# Patient Record
Sex: FEMALE | Race: WHITE | Employment: UNEMPLOYED | URBAN - METROPOLITAN AREA
[De-identification: names, ages, dates, MRNs, and addresses within clinical notes are randomized per-mention and may not be internally consistent; named-entity substitution may affect disease eponyms.]

---
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
## 2020-07-30 ENCOUNTER — TELEPHONE (OUTPATIENT)
Dept: FAMILY MEDICINE CLINIC | Age: 20
End: 2020-07-30

## 2020-07-30 NOTE — TELEPHONE ENCOUNTER
Spoke with patient and she is requesting refill of BCP that we are not the prescriber for. Advised patient needs appt--last seen > 1 year ago. Patient will look at her work schedule and call back to schedule.

## 2020-07-31 ENCOUNTER — OFFICE VISIT (OUTPATIENT)
Dept: FAMILY MEDICINE CLINIC | Age: 20
End: 2020-07-31
Payer: COMMERCIAL

## 2020-07-31 VITALS
SYSTOLIC BLOOD PRESSURE: 124 MMHG | WEIGHT: 183 LBS | DIASTOLIC BLOOD PRESSURE: 68 MMHG | HEIGHT: 63 IN | RESPIRATION RATE: 20 BRPM | TEMPERATURE: 96.3 F | HEART RATE: 96 BPM | BODY MASS INDEX: 32.43 KG/M2

## 2020-07-31 PROCEDURE — 99395 PREV VISIT EST AGE 18-39: CPT | Performed by: FAMILY MEDICINE

## 2020-07-31 RX ORDER — NORGESTIMATE AND ETHINYL ESTRADIOL 7DAYSX3 28
KIT ORAL
COMMUNITY
Start: 2020-06-22 | End: 2020-07-31 | Stop reason: SDUPTHER

## 2020-07-31 RX ORDER — NORGESTIMATE AND ETHINYL ESTRADIOL 7DAYSX3 28
KIT ORAL
Qty: 30 TABLET | Refills: 11 | Status: SHIPPED | OUTPATIENT
Start: 2020-07-31 | End: 2021-06-01 | Stop reason: SDUPTHER

## 2020-07-31 SDOH — ECONOMIC STABILITY: INCOME INSECURITY: HOW HARD IS IT FOR YOU TO PAY FOR THE VERY BASICS LIKE FOOD, HOUSING, MEDICAL CARE, AND HEATING?: NOT HARD AT ALL

## 2020-07-31 SDOH — ECONOMIC STABILITY: FOOD INSECURITY: WITHIN THE PAST 12 MONTHS, YOU WORRIED THAT YOUR FOOD WOULD RUN OUT BEFORE YOU GOT MONEY TO BUY MORE.: NEVER TRUE

## 2020-07-31 SDOH — ECONOMIC STABILITY: TRANSPORTATION INSECURITY
IN THE PAST 12 MONTHS, HAS LACK OF TRANSPORTATION KEPT YOU FROM MEETINGS, WORK, OR FROM GETTING THINGS NEEDED FOR DAILY LIVING?: NO

## 2020-07-31 SDOH — ECONOMIC STABILITY: FOOD INSECURITY: WITHIN THE PAST 12 MONTHS, THE FOOD YOU BOUGHT JUST DIDN'T LAST AND YOU DIDN'T HAVE MONEY TO GET MORE.: NEVER TRUE

## 2020-07-31 SDOH — ECONOMIC STABILITY: TRANSPORTATION INSECURITY
IN THE PAST 12 MONTHS, HAS THE LACK OF TRANSPORTATION KEPT YOU FROM MEDICAL APPOINTMENTS OR FROM GETTING MEDICATIONS?: NO

## 2020-07-31 ASSESSMENT — PATIENT HEALTH QUESTIONNAIRE - PHQ9
SUM OF ALL RESPONSES TO PHQ QUESTIONS 1-9: 0
SUM OF ALL RESPONSES TO PHQ9 QUESTIONS 1 & 2: 0
1. LITTLE INTEREST OR PLEASURE IN DOING THINGS: 0
SUM OF ALL RESPONSES TO PHQ QUESTIONS 1-9: 0
2. FEELING DOWN, DEPRESSED OR HOPELESS: 0

## 2020-07-31 ASSESSMENT — ENCOUNTER SYMPTOMS
RESPIRATORY NEGATIVE: 1
GASTROINTESTINAL NEGATIVE: 1

## 2020-07-31 NOTE — PROGRESS NOTES
Visit Information    Have you changed or started any medications since your last visit including any over-the-counter medicines, vitamins, or herbal medicines? no   Are you having any side effects from any of your medications? -  no  Have you stopped taking any of your medications? Is so, why? -  no    Have you seen any other physician or provider since your last visit? No  Have you had any other diagnostic tests since your last visit? No  Have you been seen in the emergency room and/or had an admission to a hospital since we last saw you? No  Have you had your routine dental cleaning in the past 6 months? no    Have you activated your [employer] account? If not, what are your barriers?  No: pt choice     Patient Care Team:  Anna Reynaga DO as PCP - General (Family Medicine)  Anna Reynaga DO as PCP - [facility] Provider    Medical History Review  Past Medical, Family, and Social History reviewed and does contribute to the patient presenting condition    Health Maintenance   Topic Date Due    Varicella vaccine (1 of 2 - 2-dose childhood series) 01/23/2001    HIV screen  01/23/2015    Chlamydia screen  01/23/2016    Flu vaccine (1) 09/01/2020    DTaP/Tdap/Td vaccine (2 - Td) 05/30/2022    HPV vaccine  Completed    Meningococcal (ACWY) vaccine  Completed    Hepatitis A vaccine  Aged Out    Hepatitis B vaccine  Aged Out    Hib vaccine  Aged Out    Pneumococcal 0-64 years Vaccine  Aged Out

## 2021-06-01 DIAGNOSIS — Z30.41 ORAL CONTRACEPTIVE USE: ICD-10-CM

## 2021-06-01 RX ORDER — NORGESTIMATE AND ETHINYL ESTRADIOL 7DAYSX3 28
KIT ORAL
Qty: 30 TABLET | Refills: 11 | Status: SHIPPED | OUTPATIENT
Start: 2021-06-01 | End: 2022-06-09 | Stop reason: SDUPTHER

## 2022-05-27 DIAGNOSIS — Z30.41 ORAL CONTRACEPTIVE USE: ICD-10-CM

## 2022-05-27 RX ORDER — NORGESTIMATE AND ETHINYL ESTRADIOL 7DAYSX3 28
KIT ORAL
Qty: 28 TABLET | OUTPATIENT
Start: 2022-05-27

## 2022-06-02 DIAGNOSIS — Z30.41 ORAL CONTRACEPTIVE USE: ICD-10-CM

## 2022-06-02 RX ORDER — NORGESTIMATE AND ETHINYL ESTRADIOL 7DAYSX3 28
KIT ORAL
Qty: 30 TABLET | Refills: 11 | OUTPATIENT
Start: 2022-06-02

## 2022-06-02 NOTE — TELEPHONE ENCOUNTER
Stephanie Zapata called requesting a refill of the below medication which has been pended for you:     Requested Prescriptions     Pending Prescriptions Disp Refills    Norgestim-Eth Estrad Triphasic 0.18/0.215/0.25 MG-35 MCG TABS 30 tablet 11     Sig: take 1 tablet by mouth once daily       Last Appointment Date: 7/31/2020  Next Appointment Date: Visit date not found    No Known Allergies     If no call back patient will check with ETHAN Marvin at 2 pm today.

## 2022-06-06 NOTE — TELEPHONE ENCOUNTER
We have made numerous attempts to contact pt to schedule appt. See 5/27/22 and 6/2/22 Nany regarding.

## 2022-06-06 NOTE — TELEPHONE ENCOUNTER
----- Message from Navneet Alan sent at 6/6/2022  9:26 AM EDT -----  Subject: Refill Request    QUESTIONS  Name of Medication? Norgestim-Eth Estrad Triphasic 0.18/0.215/0.25 MG-35   MCG TABS  Patient-reported dosage and instructions? take 1 tablet by mouth once   daily  How many days do you have left? 0  Preferred Pharmacy? [id number] phone number (if available)? [phone]  ---------------------------------------------------------------------------  --------------  CALL BACK INFO  What is the best way for the office to contact you? OK to leave message on   voicemail  Preferred Call Back Phone Number? [phone]  ---------------------------------------------------------------------------  --------------  SCRIPT ANSWERS  Relationship to Patient?  Self

## 2022-06-09 ENCOUNTER — TELEMEDICINE (OUTPATIENT)
Dept: FAMILY MEDICINE CLINIC | Age: 22
End: 2022-06-09
Payer: COMMERCIAL

## 2022-06-09 DIAGNOSIS — Z30.41 ORAL CONTRACEPTIVE USE: ICD-10-CM

## 2022-06-09 DIAGNOSIS — Z00.00 WELL ADULT HEALTH CHECK: Primary | ICD-10-CM

## 2022-06-09 PROCEDURE — 99395 PREV VISIT EST AGE 18-39: CPT | Performed by: FAMILY MEDICINE

## 2022-06-09 RX ORDER — NORGESTIMATE AND ETHINYL ESTRADIOL 7DAYSX3 28
KIT ORAL
Qty: 90 TABLET | Refills: 3 | Status: SHIPPED | OUTPATIENT
Start: 2022-06-09

## 2022-06-09 ASSESSMENT — ENCOUNTER SYMPTOMS
GASTROINTESTINAL NEGATIVE: 1
RESPIRATORY NEGATIVE: 1

## 2022-06-09 NOTE — PROGRESS NOTES
Harry Jones (:  2000) is a Established patient, here for evaluation of the following:         Subjective   HPI:    Chief Complaint   Patient presents with    Annual Exam     Annual eval.  Doing well. Needs refill OCPs. Has never had a pap. No concerns. Patient Active Problem List   Diagnosis    Development disorder, child    ADD (attention deficit disorder)    Kidney duplication     No past surgical history on file. Social History     Tobacco Use    Smoking status: Never Smoker    Smokeless tobacco: Never Used   Substance Use Topics    Alcohol use: No    Drug use: No       Review of Systems   Constitutional: Negative. HENT: Negative. Respiratory: Negative. Cardiovascular: Negative. Gastrointestinal: Negative. Musculoskeletal: Negative. All other systems reviewed and are negative. Objective   Patient-Reported Vitals  No data recorded     Physical Exam  Constitutional:       General: She is not in acute distress. Appearance: Normal appearance. She is well-developed. She is not ill-appearing. HENT:      Head: Normocephalic and atraumatic. Right Ear: External ear normal.      Left Ear: External ear normal.   Eyes:      Conjunctiva/sclera: Conjunctivae normal.   Pulmonary:      Effort: Pulmonary effort is normal. No respiratory distress. Skin:     Findings: No rash (on exposed surfaces). Neurological:      Mental Status: She is alert and oriented to person, place, and time. Psychiatric:         Mood and Affect: Mood normal.         Behavior: Behavior normal.         Thought Content: Thought content normal.         Judgment: Judgment normal.     Assessment & Plan   Below is the assessment and plan developed based on review of pertinent history, physical exam, labs, studies, and medications. 1. Well adult health check  2.  Oral contraceptive use  -     Norgestim-Eth Estrad Triphasic 0.18/0.215/0.25 MG-35 MCG TABS; take 1 tablet by mouth once daily, Disp-90 tablet, R-3Normal    -  Healthy lifestyle discussed  -  Refill OCP sent  -  Has never had a pap, recommendation for screening discussed    Return in about 1 year (around 6/9/2023). Beth Gómez, was evaluated through a synchronous (real-time) audio-video encounter. The patient (or guardian if applicable) is aware that this is a billable service, which includes applicable co-pays. This Virtual Visit was conducted with patient's (and/or legal guardian's) consent. The visit was conducted pursuant to the emergency declaration under the [facility], [address] [place] [address] waVA Hospital authority and the [email or web address] and for[MD] General Act. Patient identification was verified, and a caregiver was present when appropriate. The patient was located at Home: [address] [place] [address]. Provider was located at [facility] (Appt Dept): [address]  [address] [facility],  [address].         --Cheyenne Botello DO

## 2022-06-28 ENCOUNTER — NURSE ONLY (OUTPATIENT)
Dept: LAB | Age: 22
End: 2022-06-28

## 2022-07-08 LAB — CYTOLOGY THIN PREP PAP: NORMAL

## 2023-04-18 ENCOUNTER — HOSPITAL ENCOUNTER (EMERGENCY)
Age: 23
Discharge: HOME | End: 2023-04-18
Payer: COMMERCIAL

## 2023-04-18 VITALS
BODY MASS INDEX: 31.15 KG/M2 | WEIGHT: 165 LBS | SYSTOLIC BLOOD PRESSURE: 145 MMHG | HEART RATE: 99 BPM | DIASTOLIC BLOOD PRESSURE: 91 MMHG | RESPIRATION RATE: 16 BRPM | TEMPERATURE: 98.1 F | OXYGEN SATURATION: 100 % | HEIGHT: 61 IN

## 2023-04-18 DIAGNOSIS — R42 VERTIGO: Primary | ICD-10-CM

## 2023-04-18 PROCEDURE — 99203 OFFICE O/P NEW LOW 30 MIN: CPT

## 2023-04-18 PROCEDURE — 99213 OFFICE O/P EST LOW 20 MIN: CPT | Performed by: NURSE PRACTITIONER

## 2023-04-18 RX ORDER — PREDNISONE 20 MG/1
40 TABLET ORAL DAILY
Qty: 14 TABLET | Refills: 0 | Status: SHIPPED | OUTPATIENT
Start: 2023-04-18 | End: 2023-04-25

## 2023-04-18 RX ORDER — BRIMONIDINE TARTRATE 2 MG/ML
1 SOLUTION/ DROPS OPHTHALMIC 3 TIMES DAILY
COMMUNITY

## 2023-04-18 RX ORDER — MECLIZINE HYDROCHLORIDE 25 MG/1
25 TABLET ORAL 3 TIMES DAILY PRN
Qty: 30 TABLET | Refills: 0 | Status: SHIPPED | OUTPATIENT
Start: 2023-04-18 | End: 2023-04-28

## 2023-04-18 ASSESSMENT — ENCOUNTER SYMPTOMS
VOMITING: 0
SHORTNESS OF BREATH: 0
NAUSEA: 0
SORE THROAT: 0
COUGH: 0

## 2023-04-18 ASSESSMENT — PAIN - FUNCTIONAL ASSESSMENT: PAIN_FUNCTIONAL_ASSESSMENT: NONE - DENIES PAIN

## 2023-04-18 NOTE — ED TRIAGE NOTES
Pt co dizziness that started lase evening with hot flashes. Pt co increased symptoms when turning head from side to side as well as sudden position changes.

## 2023-04-18 NOTE — ED NOTES
PT GIVEN DISCHARGE INSTRUCTIONS, VERBALIZES UNDERSTANDING. PT ASSESSMENT UNCHANGED, DISCHARGED IN STABLE CONDITION.          Francisca Garcia RN  04/18/23 [id number]

## 2023-04-18 NOTE — ED PROVIDER NOTES
[address]  Urgent Care Encounter       CHIEF COMPLAINT       Chief Complaint   Patient presents with    Dizziness       Nurses Notes reviewed and I agree except as noted in the HPI. HISTORY OF PRESENT ILLNESS   Patience Andres is a 21 y.o. female who presents for evaluation of dizziness that is been ongoing since yesterday. Patient states that the dizziness feels as though the room is spinning and seems to be worse with movements of her head or eyes. States that as long as she is sitting or standing still the symptoms do seem to improve. She denies any chest pain, shortness of breath, nausea, or vomiting. Patient denies any current respiratory symptoms but states that 1 to 2 months ago she did have what she assumes was influenza but has been asymptomatic after a week of symptoms. The history is provided by the patient. REVIEW OF SYSTEMS     Review of Systems   Constitutional:  Negative for chills and fever. HENT:  Negative for congestion and sore throat. Eyes:  Negative for visual disturbance. Respiratory:  Negative for cough and shortness of breath. Cardiovascular:  Negative for chest pain. Gastrointestinal:  Negative for nausea and vomiting. Musculoskeletal:  Negative for arthralgias and myalgias. Skin:  Negative for rash. Allergic/Immunologic: Negative for immunocompromised state. Neurological:  Positive for dizziness. Negative for headaches. PAST MEDICAL HISTORY         Diagnosis Date    Autism     Glaucoma     Kidney duplication     UTI (urinary tract infection)        SURGICALHISTORY     Patient  has no past surgical history on file. CURRENT MEDICATIONS       Previous Medications    BRIMONIDINE (ALPHAGAN) 0.2 % OPHTHALMIC SOLUTION    1 drop 3 times daily    NORGESTIM-ETH ESTRAD TRIPHASIC 0.18/0.215/0.25 MG-35 MCG TABS    take 1 tablet by mouth once daily       ALLERGIES     Patient is has No Known Allergies.     Patients   Immunization History

## 2023-04-19 ENCOUNTER — TELEPHONE (OUTPATIENT)
Dept: FAMILY MEDICINE CLINIC | Age: 23
End: 2023-04-19

## 2023-04-19 NOTE — TELEPHONE ENCOUNTER
----- Message from Collette Skeen sent at 4/19/2023  8:23 AM EDT -----  Subject: Message to Provider    QUESTIONS  Information for Provider? pt wants this appt. to be in person rather than   virtual (4/25/23 @ 11:30)  ---------------------------------------------------------------------------  --------------  Cindy MARCOS  [phone]; OK to leave message on voicemail  ---------------------------------------------------------------------------  --------------  SCRIPT ANSWERS  Relationship to Patient?  Self

## 2023-04-25 ENCOUNTER — OFFICE VISIT (OUTPATIENT)
Dept: FAMILY MEDICINE CLINIC | Age: 23
End: 2023-04-25
Payer: COMMERCIAL

## 2023-04-25 VITALS
WEIGHT: 170.6 LBS | RESPIRATION RATE: 16 BRPM | HEART RATE: 84 BPM | BODY MASS INDEX: 32.23 KG/M2 | SYSTOLIC BLOOD PRESSURE: 132 MMHG | DIASTOLIC BLOOD PRESSURE: 76 MMHG

## 2023-04-25 DIAGNOSIS — H81.10 BENIGN PAROXYSMAL POSITIONAL VERTIGO, UNSPECIFIED LATERALITY: Primary | ICD-10-CM

## 2023-04-25 PROCEDURE — G8417 CALC BMI ABV UP PARAM F/U: HCPCS | Performed by: FAMILY MEDICINE

## 2023-04-25 PROCEDURE — G8427 DOCREV CUR MEDS BY ELIG CLIN: HCPCS | Performed by: FAMILY MEDICINE

## 2023-04-25 PROCEDURE — 1036F TOBACCO NON-USER: CPT | Performed by: FAMILY MEDICINE

## 2023-04-25 PROCEDURE — 99213 OFFICE O/P EST LOW 20 MIN: CPT | Performed by: FAMILY MEDICINE

## 2023-04-25 RX ORDER — NORGESTIMATE AND ETHINYL ESTRADIOL 7DAYSX3 28
KIT ORAL
Qty: 90 TABLET | Refills: 3 | Status: SHIPPED | OUTPATIENT
Start: 2023-04-25

## 2023-04-25 SDOH — ECONOMIC STABILITY: FOOD INSECURITY: WITHIN THE PAST 12 MONTHS, YOU WORRIED THAT YOUR FOOD WOULD RUN OUT BEFORE YOU GOT MONEY TO BUY MORE.: NEVER TRUE

## 2023-04-25 SDOH — ECONOMIC STABILITY: HOUSING INSECURITY
IN THE LAST 12 MONTHS, WAS THERE A TIME WHEN YOU DID NOT HAVE A STEADY PLACE TO SLEEP OR SLEPT IN A SHELTER (INCLUDING NOW)?: NO

## 2023-04-25 SDOH — ECONOMIC STABILITY: FOOD INSECURITY: WITHIN THE PAST 12 MONTHS, THE FOOD YOU BOUGHT JUST DIDN'T LAST AND YOU DIDN'T HAVE MONEY TO GET MORE.: NEVER TRUE

## 2023-04-25 SDOH — ECONOMIC STABILITY: INCOME INSECURITY: HOW HARD IS IT FOR YOU TO PAY FOR THE VERY BASICS LIKE FOOD, HOUSING, MEDICAL CARE, AND HEATING?: NOT HARD AT ALL

## 2023-04-25 ASSESSMENT — PATIENT HEALTH QUESTIONNAIRE - PHQ9
1. LITTLE INTEREST OR PLEASURE IN DOING THINGS: 1
SUM OF ALL RESPONSES TO PHQ QUESTIONS 1-9: 2
2. FEELING DOWN, DEPRESSED OR HOPELESS: 1
SUM OF ALL RESPONSES TO PHQ QUESTIONS 1-9: 2
SUM OF ALL RESPONSES TO PHQ9 QUESTIONS 1 & 2: 2

## 2023-04-25 ASSESSMENT — ENCOUNTER SYMPTOMS
GASTROINTESTINAL NEGATIVE: 1
RESPIRATORY NEGATIVE: 1

## 2023-04-25 NOTE — PROGRESS NOTES
Brian Carter (:  2000) is a 21 y.o. female,Established patient, here for evaluation of the following chief complaint(s):  ED Follow-up (In [facility] on 23 - Vertigo) and Medication Refill          Subjective   SUBJECTIVE/OBJECTIVE:  HPI  Chief Complaint   Patient presents with    ED Follow-up     In [address] [place], [place] [id number] on 23 - Vertigo    Medication Refill     UC follow up. CHIEF COMPLAINT            Chief Complaint   Patient presents with    Dizziness         Nurses Notes reviewed and I agree except as noted in the HPI. HISTORY OF PRESENT ILLNESS   Brian Carter is a 21 y.o. female who presents for evaluation of dizziness that is been ongoing since yesterday. Patient states that the dizziness feels as though the room is spinning and seems to be worse with movements of her head or eyes. States that as long as she is sitting or standing still the symptoms do seem to improve. She denies any chest pain, shortness of breath, nausea, or vomiting. Patient denies any current respiratory symptoms but states that 1 to 2 months ago she did have what she assumes was influenza but has been asymptomatic after a week of symptoms. Dizziness began on the 17. Continues with mild dizziness and some right ear pain. Placed on meclizine and prednisone. 70% improvement since the UC visit. No HA's. Patient Active Problem List   Diagnosis    Development disorder, child    ADD (attention deficit disorder)    Kidney duplication       Current Outpatient Medications   Medication Sig Dispense Refill    Norgestim-Eth Estrad Triphasic 0.18/0.215/0.25 MG-35 MCG TABS take 1 tablet by mouth once daily 90 tablet 3    brimonidine (ALPHAGAN) 0.2 % ophthalmic solution 1 drop 3 times daily      meclizine (ANTIVERT) 25 MG tablet Take 1 tablet by mouth 3 times daily as needed for Dizziness 30 tablet 0     No current facility-administered medications for this visit. No past surgical history on file.     Review of

## 2023-05-17 LAB
A/G RATIO: 1.4 (ref 1.5–2.5)
ABSOLUTE BASO #: 0 /CMM (ref 0–200)
ABSOLUTE EOS #: 100 /CMM (ref 0–500)
ABSOLUTE LYMPH #: 3200 /CMM (ref 1000–4800)
ABSOLUTE MONO #: 500 /CMM (ref 0–800)
ABSOLUTE NEUT #: 4300 /CMM (ref 1800–7700)
ALBUMIN SERPL-MCNC: 4.1 G/DL (ref 3.5–5)
ALP BLD-CCNC: 64 IU/L (ref 39–118)
ALT SERPL-CCNC: 8 IU/L (ref 10–40)
ANION GAP SERPL CALCULATED.3IONS-SCNC: 8 MMOL/L (ref 4–12)
APPEARANCE: ABNORMAL
AST SERPL-CCNC: 11 IU/L (ref 15–41)
BACTERIA: ABNORMAL
BASOPHILS RELATIVE PERCENT: 0.3 % (ref 0–2)
BILIRUB SERPL-MCNC: 0.3 MG/DL (ref 0.2–1)
BILIRUBIN URINE: NEGATIVE
BUN BLDV-MCNC: 11 MG/DL (ref 7–20)
CALCIUM SERPL-MCNC: 9 MG/DL (ref 8.8–10.5)
CHLORIDE BLD-SCNC: 103 MEQ/L (ref 101–111)
CHOLESTEROL/HDL RELATIVE RISK: 3.2 (ref 4–4.4)
CHOLESTEROL: 202 MG/DL
CO2: 27 MEQ/L (ref 21–32)
COLOR: YELLOW
CREAT SERPL-MCNC: 0.79 MG/DL (ref 0.6–1.3)
CREATININE CLEARANCE: >60
DIRECT-LDL / HDL RISK: 2.1
EOSINOPHILS RELATIVE PERCENT: 0.7 % (ref 0–6)
GLUCOSE URINE: NEGATIVE
GLUCOSE: 71 MG/DL (ref 70–110)
HCT VFR BLD CALC: 38.7 % (ref 35–44)
HDLC SERPL-MCNC: 62 MG/DL
HEMOGLOBIN: 13.2 GM/DL (ref 12–15)
KETONES, URINE: NEGATIVE
LDL CHOLESTEROL DIRECT: 133 MG/DL
LEUKOCYTES, UA: NEGATIVE
LYMPHOCYTES RELATIVE PERCENT: 39.4 % (ref 15–45)
MCH RBC QN AUTO: 30.2 PG (ref 27.5–33)
MCHC RBC AUTO-ENTMCNC: 34 GM/DL (ref 33–36)
MCV RBC AUTO: 88.7 CU MIC (ref 80–97)
MONOCYTES RELATIVE PERCENT: 6.1 % (ref 2–10)
MUCUS: PRESENT
NEUTROPHILS RELATIVE PERCENT: 53.5 % (ref 40–70)
NITRITE, URINE: NEGATIVE
NUCLEATED RBCS: 0.1 /100 WBC
PDW BLD-RTO: 12.7 % (ref 12–16)
PH, URINE: 5.5 (ref 5–8)
PLATELET # BLD: 389 TH/CMM (ref 150–400)
POTASSIUM SERPL-SCNC: 3.9 MEQ/L (ref 3.6–5)
PROTEIN, URINE: ABNORMAL
RBC # BLD: 4.36 MIL/CMM (ref 4–5.1)
RBC URINE: ABNORMAL /HPF
SEDIMENTATION RATE, ERYTHROCYTE: 8 MM/HR
SODIUM BLD-SCNC: 138 MEQ/L (ref 135–145)
SPECIFIC GRAVITY UA: 1.03 (ref 1–1.03)
SQUAMOUS EPITHELIAL: ABNORMAL /HPF
TOTAL PROTEIN: 7.1 G/DL (ref 6.2–8)
TRIGL SERPL-MCNC: 127 MG/DL
TSH REFLEX: 2.6 MCIU/ML (ref 0.49–4.67)
URIC ACID: 5.2 MG/DL (ref 2.6–8)
URINALYSIS REFLEX: NO
URINE HGB: NEGATIVE
UROBILINOGEN, URINE: ABNORMAL (ref 0.2–1)
VLDLC SERPL CALC-MCNC: 25 MG/DL
WBC # BLD: 8.1 TH/CMM (ref 4.4–10.5)
WBC URINE: ABNORMAL /HPF

## 2023-05-18 LAB
DOUBLE STRANDED DNA AB, IGG: 1.9 IU/ML
ENA ANTIBODIES SCREEN: 0.1 RATIO
RHEUMATOID FACTOR: NEGATIVE

## 2024-03-28 RX ORDER — NORGESTIMATE AND ETHINYL ESTRADIOL 7DAYSX3 28
KIT ORAL
Qty: 84 TABLET | Refills: 3 | Status: SHIPPED | OUTPATIENT
Start: 2024-03-28

## 2024-06-19 PROBLEM — E23.0 HYPOPITUITARISM (HCC): Status: ACTIVE | Noted: 2024-06-19

## 2024-06-19 RX ORDER — EPINEPHRINE 1 MG/ML
0.3 INJECTION, SOLUTION, CONCENTRATE INTRAVENOUS PRN
OUTPATIENT
Start: 2024-06-27

## 2024-06-19 RX ORDER — ONDANSETRON 2 MG/ML
8 INJECTION INTRAMUSCULAR; INTRAVENOUS
OUTPATIENT
Start: 2024-06-27

## 2024-06-19 RX ORDER — ACETAMINOPHEN 325 MG/1
650 TABLET ORAL
OUTPATIENT
Start: 2024-06-27

## 2024-06-19 RX ORDER — SODIUM CHLORIDE 9 MG/ML
5-250 INJECTION, SOLUTION INTRAVENOUS PRN
OUTPATIENT
Start: 2024-06-27

## 2024-06-19 RX ORDER — ALBUTEROL SULFATE 90 UG/1
4 AEROSOL, METERED RESPIRATORY (INHALATION) PRN
OUTPATIENT
Start: 2024-06-27

## 2024-06-19 RX ORDER — DIPHENHYDRAMINE HYDROCHLORIDE 50 MG/ML
50 INJECTION INTRAMUSCULAR; INTRAVENOUS
OUTPATIENT
Start: 2024-06-27

## 2024-06-19 RX ORDER — HEPARIN 100 UNIT/ML
500 SYRINGE INTRAVENOUS PRN
OUTPATIENT
Start: 2024-06-27

## 2024-06-19 RX ORDER — SODIUM CHLORIDE 9 MG/ML
INJECTION, SOLUTION INTRAVENOUS CONTINUOUS
OUTPATIENT
Start: 2024-06-27

## 2024-06-27 ENCOUNTER — HOSPITAL ENCOUNTER (OUTPATIENT)
Dept: INFUSION THERAPY | Age: 24
Discharge: HOME | End: 2024-06-27
Payer: COMMERCIAL

## 2024-06-27 VITALS
HEART RATE: 91 BPM | SYSTOLIC BLOOD PRESSURE: 136 MMHG | TEMPERATURE: 97.4 F | RESPIRATION RATE: 14 BRPM | OXYGEN SATURATION: 100 % | DIASTOLIC BLOOD PRESSURE: 91 MMHG

## 2024-06-27 DIAGNOSIS — E23.0 HYPOPITUITARISM (HCC): Primary | ICD-10-CM

## 2024-06-27 LAB
CORTIS SERPL-MCNC: 26.4 UG/DL (ref 2.5–19.5)
CORTIS SERPL-MCNC: 33 UG/DL (ref 2.5–19.5)
CORTIS SERPL-MCNC: 34.7 UG/DL (ref 2.5–19.5)
CORTISOL COLLECTION INFO: ABNORMAL

## 2024-06-27 PROCEDURE — 36415 COLL VENOUS BLD VENIPUNCTURE: CPT

## 2024-06-27 PROCEDURE — 82533 TOTAL CORTISOL: CPT

## 2024-06-27 PROCEDURE — 6360000002 HC RX W HCPCS: Performed by: INTERNAL MEDICINE

## 2024-06-27 PROCEDURE — 96374 THER/PROPH/DIAG INJ IV PUSH: CPT

## 2024-06-27 PROCEDURE — 96375 TX/PRO/DX INJ NEW DRUG ADDON: CPT

## 2024-06-27 RX ORDER — COSYNTROPIN 0.25 MG/ML
250 INJECTION, POWDER, FOR SOLUTION INTRAMUSCULAR; INTRAVENOUS ONCE
Status: CANCELLED | OUTPATIENT
Start: 2024-06-27 | End: 2024-06-27

## 2024-06-27 RX ORDER — HEPARIN 100 UNIT/ML
500 SYRINGE INTRAVENOUS PRN
OUTPATIENT
Start: 2024-06-27

## 2024-06-27 RX ORDER — COSYNTROPIN 0.25 MG/ML
250 INJECTION, POWDER, FOR SOLUTION INTRAMUSCULAR; INTRAVENOUS ONCE
Status: COMPLETED | OUTPATIENT
Start: 2024-06-27 | End: 2024-06-27

## 2024-06-27 RX ORDER — SODIUM CHLORIDE 9 MG/ML
5-250 INJECTION, SOLUTION INTRAVENOUS PRN
OUTPATIENT
Start: 2024-06-27

## 2024-06-27 RX ORDER — ACETAMINOPHEN 325 MG/1
650 TABLET ORAL
OUTPATIENT
Start: 2024-06-27

## 2024-06-27 RX ORDER — SODIUM CHLORIDE 0.9 % (FLUSH) 0.9 %
5-40 SYRINGE (ML) INJECTION PRN
Status: DISCONTINUED | OUTPATIENT
Start: 2024-06-27 | End: 2024-06-28 | Stop reason: HOSPADM

## 2024-06-27 RX ORDER — ALBUTEROL SULFATE 90 UG/1
4 AEROSOL, METERED RESPIRATORY (INHALATION) PRN
OUTPATIENT
Start: 2024-06-27

## 2024-06-27 RX ORDER — ONDANSETRON 2 MG/ML
8 INJECTION INTRAMUSCULAR; INTRAVENOUS
OUTPATIENT
Start: 2024-06-27

## 2024-06-27 RX ORDER — SODIUM CHLORIDE 9 MG/ML
INJECTION, SOLUTION INTRAVENOUS CONTINUOUS
OUTPATIENT
Start: 2024-06-27

## 2024-06-27 RX ORDER — DIPHENHYDRAMINE HYDROCHLORIDE 50 MG/ML
50 INJECTION INTRAMUSCULAR; INTRAVENOUS
OUTPATIENT
Start: 2024-06-27

## 2024-06-27 RX ORDER — SODIUM CHLORIDE 0.9 % (FLUSH) 0.9 %
5-40 SYRINGE (ML) INJECTION PRN
OUTPATIENT
Start: 2024-06-27

## 2024-06-27 RX ORDER — EPINEPHRINE 1 MG/ML
0.3 INJECTION, SOLUTION, CONCENTRATE INTRAVENOUS PRN
OUTPATIENT
Start: 2024-06-27

## 2024-06-27 RX ADMIN — COSYNTROPIN 250 MCG: 0.25 INJECTION, POWDER, LYOPHILIZED, FOR SOLUTION INTRAVENOUS at 09:02

## 2024-06-27 NOTE — PROGRESS NOTES
Cortrosyn stimulation test completed and no sign of reaction at this time.  Pt ambulated out infusion center without difficulty in stable condition.  Pt will follow up with her Doctor regarding follow up.

## 2024-07-25 ENCOUNTER — CLINICAL DOCUMENTATION (OUTPATIENT)
Facility: HOSPITAL | Age: 24
End: 2024-07-25

## 2024-08-12 RX ORDER — NORGESTIMATE AND ETHINYL ESTRADIOL 7DAYSX3 28
KIT ORAL
Qty: 84 TABLET | Refills: 3 | Status: SHIPPED | OUTPATIENT
Start: 2024-08-12

## 2024-11-26 ENCOUNTER — OFFICE VISIT (OUTPATIENT)
Dept: FAMILY MEDICINE CLINIC | Age: 24
End: 2024-11-26
Payer: COMMERCIAL

## 2024-11-26 VITALS
HEART RATE: 88 BPM | WEIGHT: 164.6 LBS | DIASTOLIC BLOOD PRESSURE: 70 MMHG | HEIGHT: 61 IN | SYSTOLIC BLOOD PRESSURE: 114 MMHG | RESPIRATION RATE: 18 BRPM | BODY MASS INDEX: 31.08 KG/M2

## 2024-11-26 DIAGNOSIS — Z00.00 WELL ADULT HEALTH CHECK: Primary | ICD-10-CM

## 2024-11-26 PROCEDURE — 99395 PREV VISIT EST AGE 18-39: CPT | Performed by: FAMILY MEDICINE

## 2024-11-26 RX ORDER — NORGESTIMATE AND ETHINYL ESTRADIOL 7DAYSX3 28
KIT ORAL
Qty: 84 TABLET | Refills: 3 | Status: SHIPPED | OUTPATIENT
Start: 2024-11-26

## 2024-11-26 SDOH — ECONOMIC STABILITY: FOOD INSECURITY: WITHIN THE PAST 12 MONTHS, THE FOOD YOU BOUGHT JUST DIDN'T LAST AND YOU DIDN'T HAVE MONEY TO GET MORE.: NEVER TRUE

## 2024-11-26 SDOH — ECONOMIC STABILITY: FOOD INSECURITY: WITHIN THE PAST 12 MONTHS, YOU WORRIED THAT YOUR FOOD WOULD RUN OUT BEFORE YOU GOT MONEY TO BUY MORE.: NEVER TRUE

## 2024-11-26 SDOH — ECONOMIC STABILITY: INCOME INSECURITY: HOW HARD IS IT FOR YOU TO PAY FOR THE VERY BASICS LIKE FOOD, HOUSING, MEDICAL CARE, AND HEATING?: NOT HARD AT ALL

## 2024-11-26 ASSESSMENT — PATIENT HEALTH QUESTIONNAIRE - PHQ9
SUM OF ALL RESPONSES TO PHQ QUESTIONS 1-9: 0
2. FEELING DOWN, DEPRESSED OR HOPELESS: NOT AT ALL
SUM OF ALL RESPONSES TO PHQ QUESTIONS 1-9: 0
1. LITTLE INTEREST OR PLEASURE IN DOING THINGS: NOT AT ALL
SUM OF ALL RESPONSES TO PHQ QUESTIONS 1-9: 0
SUM OF ALL RESPONSES TO PHQ QUESTIONS 1-9: 0
SUM OF ALL RESPONSES TO PHQ9 QUESTIONS 1 & 2: 0

## 2024-11-26 ASSESSMENT — ENCOUNTER SYMPTOMS
RESPIRATORY NEGATIVE: 1
GASTROINTESTINAL NEGATIVE: 1

## 2024-11-26 NOTE — PROGRESS NOTES
Neck supple.   Skin:     General: Skin is warm and dry.      Findings: No rash (on exposed surfaces).   Neurological:      General: No focal deficit present.      Mental Status: She is alert.   Psychiatric:         Attention and Perception: Attention normal.         Mood and Affect: Mood normal.         Speech: Speech normal.         Behavior: Behavior normal. Behavior is cooperative.         Thought Content: Thought content normal.         Judgment: Judgment normal.            Assessment & Plan   ASSESSMENT/PLAN:  1. Well adult health check    -  Doing well, no concerns  -  Refill OCP sent  -  Pap UTD per pt, due again next year    Return in about 1 year (around 11/26/2025) for Wellness.             An electronic signature was used to authenticate this note.    --Seamus Griffin, DO

## 2025-09-05 ENCOUNTER — TELEPHONE (OUTPATIENT)
Dept: FAMILY MEDICINE CLINIC | Age: 25
End: 2025-09-05